# Patient Record
Sex: FEMALE | Race: WHITE | NOT HISPANIC OR LATINO | Employment: UNEMPLOYED | URBAN - NONMETROPOLITAN AREA
[De-identification: names, ages, dates, MRNs, and addresses within clinical notes are randomized per-mention and may not be internally consistent; named-entity substitution may affect disease eponyms.]

---

## 2023-05-01 ENCOUNTER — OFFICE VISIT (OUTPATIENT)
Dept: PRIMARY CARE | Facility: CLINIC | Age: 53
End: 2023-05-01
Payer: COMMERCIAL

## 2023-05-01 VITALS — HEART RATE: 70 BPM | OXYGEN SATURATION: 95 % | TEMPERATURE: 98.8 F

## 2023-05-01 DIAGNOSIS — J01.00 ACUTE NON-RECURRENT MAXILLARY SINUSITIS: Primary | ICD-10-CM

## 2023-05-01 PROCEDURE — 99213 OFFICE O/P EST LOW 20 MIN: CPT

## 2023-05-01 PROCEDURE — 1036F TOBACCO NON-USER: CPT

## 2023-05-01 RX ORDER — LEVONORGESTREL AND ETHINYL ESTRADIOL 6-5-10
1 KIT ORAL DAILY
COMMUNITY

## 2023-05-01 RX ORDER — DOXYCYCLINE 100 MG/1
100 TABLET ORAL 2 TIMES DAILY
Qty: 20 TABLET | Refills: 0 | Status: SHIPPED | OUTPATIENT
Start: 2023-05-01 | End: 2023-05-11

## 2023-05-01 ASSESSMENT — ENCOUNTER SYMPTOMS
FEVER: 0
RHINORRHEA: 1
ENDOCRINE NEGATIVE: 1
NEUROLOGICAL NEGATIVE: 1
PSYCHIATRIC NEGATIVE: 1
SORE THROAT: 1
EYE ITCHING: 1
SINUS PRESSURE: 1
GASTROINTESTINAL NEGATIVE: 1
MUSCULOSKELETAL NEGATIVE: 1
CARDIOVASCULAR NEGATIVE: 1
FATIGUE: 1
HEMATOLOGIC/LYMPHATIC NEGATIVE: 1
RESPIRATORY NEGATIVE: 1
ALLERGIC/IMMUNOLOGIC NEGATIVE: 1
EYE DISCHARGE: 1

## 2023-05-01 NOTE — PROGRESS NOTES
Subjective   Patient ID: Kaye Jarrett is a 53 y.o. female who presents for Cough (X1 week- productive cough-yellowish sputum), Sore Throat (X1 week), and Sinus Problem (X1 week has not tested for covid. Has not been around anyone sick.).  Patient presents car side with complaints of cough, congestion, facial pain, postnasal drainage, fatigue for the past week    No complaints of fever, has not been around anyone who is sick, no contact with anyone who has COVID  Patient has been taking Lindsay-Hunters plus for her symptoms as well as DayQuil to no relief  She started antihistamine approximately 3 days ago        Vitals:    05/01/23 1006   Pulse: 70   Temp: 37.1 °C (98.8 °F)   SpO2: 95%       Review of Systems   Constitutional:  Positive for fatigue. Negative for fever.   HENT:  Positive for congestion, dental problem, postnasal drip, rhinorrhea, sinus pressure and sore throat. Negative for hearing loss.    Eyes:  Positive for discharge and itching.   Respiratory: Negative.     Cardiovascular: Negative.    Gastrointestinal: Negative.    Endocrine: Negative.    Genitourinary: Negative.    Musculoskeletal: Negative.    Skin: Negative.    Allergic/Immunologic: Negative.    Neurological: Negative.    Hematological: Negative.    Psychiatric/Behavioral: Negative.         Objective   Physical Exam  Vitals (Deferred physical exam patient seen curbside) reviewed.         Assessment/Plan   Problem List Items Addressed This Visit    None  Visit Diagnoses       Acute non-recurrent maxillary sinusitis    -  Primary    Relevant Medications    doxycycline (Adoxa) 100 mg tablet    dextromethorphan-guaifenesin (Mucinex DM)  mg 12 hr tablet            Time Spent  Prep time on day of patient encounter: 5 minutes  Time spent directly with patient, family or caregiver: 15 minutes  Additional Time Spent on Patient Care Activities: 0 minutes  Documentation Time: 5 minutes  Other Time Spent: 0 minutes  Total: 25 minutes        Thank  you for coming in today, please call my office if you have any concerns or questions.     Clive HERRING, CNP

## 2023-05-01 NOTE — PATIENT INSTRUCTIONS
Treat with antibiotic and Mucinex, patient encouraged to return in 3 days if symptoms do not subside or start to improve.  Thank you for coming in today, if any questions or concerns arise, please call my office.   NEVAEH Bruner-CNP

## 2023-06-26 ENCOUNTER — OFFICE VISIT (OUTPATIENT)
Dept: PRIMARY CARE | Facility: CLINIC | Age: 53
End: 2023-06-26
Payer: COMMERCIAL

## 2023-06-26 VITALS — HEART RATE: 74 BPM | OXYGEN SATURATION: 93 % | TEMPERATURE: 98.9 F

## 2023-06-26 DIAGNOSIS — R05.1 ACUTE COUGH: Primary | ICD-10-CM

## 2023-06-26 DIAGNOSIS — R11.2 NAUSEA AND VOMITING, UNSPECIFIED VOMITING TYPE: ICD-10-CM

## 2023-06-26 PROCEDURE — 99214 OFFICE O/P EST MOD 30 MIN: CPT

## 2023-06-26 PROCEDURE — 87635 SARS-COV-2 COVID-19 AMP PRB: CPT

## 2023-06-26 PROCEDURE — 1036F TOBACCO NON-USER: CPT

## 2023-06-26 RX ORDER — METHYLPREDNISOLONE 4 MG/1
TABLET ORAL
Qty: 21 TABLET | Refills: 0 | Status: SHIPPED | OUTPATIENT
Start: 2023-06-26 | End: 2023-07-03

## 2023-06-26 RX ORDER — ONDANSETRON 4 MG/1
4 TABLET, ORALLY DISINTEGRATING ORAL EVERY 8 HOURS PRN
Qty: 12 TABLET | Refills: 0 | Status: SHIPPED | OUTPATIENT
Start: 2023-06-26 | End: 2023-06-30

## 2023-06-26 ASSESSMENT — ENCOUNTER SYMPTOMS
COUGH: 1
NEUROLOGICAL NEGATIVE: 1
SHORTNESS OF BREATH: 1
CARDIOVASCULAR NEGATIVE: 1
HEMATOLOGIC/LYMPHATIC NEGATIVE: 1
VOMITING: 1
ARTHRALGIAS: 1
PSYCHIATRIC NEGATIVE: 1
ALLERGIC/IMMUNOLOGIC NEGATIVE: 1
EYES NEGATIVE: 1
FATIGUE: 1
ENDOCRINE NEGATIVE: 1

## 2023-06-26 NOTE — PROGRESS NOTES
Subjective   Patient ID: Kaye Jarrett is a 53 y.o. female who presents for Cough (Kaye is being seen today for cough, vomiting and congestion. Has not been in contact with anyone who is sick. ).  HPI    Vitals:    06/26/23 1423   Pulse: 74   Temp: 37.2 °C (98.9 °F)   SpO2: 93%       Review of Systems   Constitutional:  Positive for fatigue.   HENT:  Positive for congestion.    Eyes: Negative.    Respiratory:  Positive for cough and shortness of breath.    Cardiovascular: Negative.    Gastrointestinal:  Positive for vomiting.   Endocrine: Negative.    Genitourinary: Negative.    Musculoskeletal:  Positive for arthralgias.   Skin: Negative.    Allergic/Immunologic: Negative.    Neurological: Negative.    Hematological: Negative.    Psychiatric/Behavioral: Negative.         Objective   Physical Exam  Vitals reviewed.   Constitutional:       General: She is not in acute distress.     Appearance: Normal appearance. She is normal weight. She is not toxic-appearing.   HENT:      Head: Normocephalic.      Nose: Rhinorrhea present.      Mouth/Throat:      Mouth: Mucous membranes are moist.   Cardiovascular:      Rate and Rhythm: Normal rate and regular rhythm.      Pulses: Normal pulses.   Pulmonary:      Effort: Pulmonary effort is normal.      Breath sounds: Normal breath sounds.   Neurological:      Mental Status: She is alert.         Assessment/Plan   Problem List Items Addressed This Visit    None  Visit Diagnoses       Acute cough    -  Primary    Relevant Medications    methylPREDNISolone (Medrol Dospak) 4 mg tablets    Other Relevant Orders    Sars-CoV-2 PCR, Symptomatic    Nausea and vomiting, unspecified vomiting type        Relevant Medications    ondansetron ODT (Zofran-ODT) 4 mg disintegrating tablet                 Thank you for coming in today, please call my office if you have any concerns or questions.     Clive HERRING, CNP

## 2023-06-27 LAB — SARS-COV-2 RESULT: NOT DETECTED

## 2023-06-28 ENCOUNTER — TELEPHONE (OUTPATIENT)
Dept: PRIMARY CARE | Facility: CLINIC | Age: 53
End: 2023-06-28
Payer: COMMERCIAL

## 2023-06-28 NOTE — TELEPHONE ENCOUNTER
Spoke with Kaye, she is going to call the office tomorrow with symptoms. If worse, I will send antibiotic.

## 2023-06-28 NOTE — TELEPHONE ENCOUNTER
Spoke with Kaye verbalized understanding. Is not feeling any better at the moment. Would like to know if you have any recommendations.

## 2023-06-28 NOTE — TELEPHONE ENCOUNTER
----- Message from NEVAEH Bruner-CNP sent at 6/27/2023  8:00 AM EDT -----  Negative for COVID please notify

## 2023-11-28 ENCOUNTER — APPOINTMENT (OUTPATIENT)
Dept: PRIMARY CARE | Facility: CLINIC | Age: 53
End: 2023-11-28
Payer: COMMERCIAL

## 2023-11-29 ENCOUNTER — OFFICE VISIT (OUTPATIENT)
Dept: PRIMARY CARE | Facility: CLINIC | Age: 53
End: 2023-11-29
Payer: COMMERCIAL

## 2023-11-29 VITALS
SYSTOLIC BLOOD PRESSURE: 122 MMHG | BODY MASS INDEX: 29.56 KG/M2 | OXYGEN SATURATION: 99 % | DIASTOLIC BLOOD PRESSURE: 66 MMHG | WEIGHT: 179 LBS | HEART RATE: 81 BPM | TEMPERATURE: 98.1 F

## 2023-11-29 DIAGNOSIS — J01.11 ACUTE RECURRENT FRONTAL SINUSITIS: Primary | ICD-10-CM

## 2023-11-29 PROCEDURE — 1036F TOBACCO NON-USER: CPT | Performed by: FAMILY MEDICINE

## 2023-11-29 PROCEDURE — 99214 OFFICE O/P EST MOD 30 MIN: CPT | Performed by: FAMILY MEDICINE

## 2023-11-29 RX ORDER — DOXYCYCLINE 100 MG/1
100 CAPSULE ORAL 2 TIMES DAILY
Qty: 28 CAPSULE | Refills: 0 | Status: SHIPPED | OUTPATIENT
Start: 2023-11-29 | End: 2023-12-13

## 2023-11-29 ASSESSMENT — ENCOUNTER SYMPTOMS
SORE THROAT: 1
UNEXPECTED WEIGHT CHANGE: 0
ARTHRALGIAS: 0
JOINT SWELLING: 0
SINUS PAIN: 1
DYSURIA: 0
EYE DISCHARGE: 0
LIGHT-HEADEDNESS: 0
FEVER: 0
ABDOMINAL PAIN: 0
SLEEP DISTURBANCE: 0
ACTIVITY CHANGE: 0
EYE ITCHING: 0
DIARRHEA: 0
FLANK PAIN: 0
DYSPHORIC MOOD: 0
CONSTIPATION: 0
RHINORRHEA: 0
NUMBNESS: 0
APPETITE CHANGE: 0
HEMATURIA: 0
MYALGIAS: 0
WHEEZING: 0
HEADACHES: 0
SHORTNESS OF BREATH: 0
WEAKNESS: 0
NAUSEA: 0
SINUS PRESSURE: 1
BLOOD IN STOOL: 0
VOMITING: 0
NERVOUS/ANXIOUS: 0
COUGH: 0
PALPITATIONS: 0
DIZZINESS: 0

## 2023-11-29 NOTE — PROGRESS NOTES
"Subjective   Patient ID: Kaye Jarrett is a 53 y.o. female who presents for Cough (All symptoms x 1.5 weeks.  DECLINES Covid swab.), Nasal Congestion, Sore Throat (Gone since last Wednesday. ), and Headache.    HPI RECURRENT ENT ISSUES   FEELS BEST WITH THE MEDROL DOSE KEVIN FOR AWHILE THEN GETS CONGESTED  TRIED FLONASE FOR TWO DAYS AND THOUGHT IT DID NOT \"WORK\" SO STOPPED THAT     Review of Systems   Constitutional:  Negative for activity change, appetite change, fever and unexpected weight change.   HENT:  Positive for congestion, ear pain, postnasal drip, sinus pressure, sinus pain and sore throat. Negative for rhinorrhea.    Eyes:  Negative for discharge, itching and visual disturbance.   Respiratory:  Negative for cough, shortness of breath and wheezing.    Cardiovascular:  Negative for chest pain, palpitations and leg swelling.   Gastrointestinal:  Negative for abdominal pain, blood in stool, constipation, diarrhea, nausea and vomiting.   Endocrine: Negative for cold intolerance, heat intolerance and polyuria.   Genitourinary:  Negative for dysuria, flank pain and hematuria.   Musculoskeletal:  Negative for arthralgias, gait problem, joint swelling and myalgias.   Skin:  Negative for rash.   Allergic/Immunologic: Negative for environmental allergies and food allergies.   Neurological:  Negative for dizziness, syncope, weakness, light-headedness, numbness and headaches.   Psychiatric/Behavioral:  Negative for dysphoric mood and sleep disturbance. The patient is not nervous/anxious.        Objective   /66   Pulse 81   Temp 36.7 °C (98.1 °F)   Wt 81.2 kg (179 lb)   SpO2 99%   BMI 29.56 kg/m²     Physical Exam  Vitals and nursing note reviewed.   Constitutional:       Appearance: Normal appearance.   HENT:      Head: Normocephalic.      Mouth/Throat:      Mouth: Mucous membranes are dry.      Pharynx: Posterior oropharyngeal erythema present.      Comments: THICK DRAINAGE   NASAL MUCOSA IS RED AND " SWOLLEN   RECURRENT DINUS ISSUES SEPTAL DEVIATION   OLD CT SHOED INFECTION IN MAXILLARY SINUS CAVITY   PERHAPS ENVIRONMENTAL ALLERGIES   Cardiovascular:      Rate and Rhythm: Normal rate and regular rhythm.      Pulses: Normal pulses.      Heart sounds: Normal heart sounds. No murmur heard.     No friction rub. No gallop.   Pulmonary:      Effort: Pulmonary effort is normal. No respiratory distress.      Breath sounds: Normal breath sounds. No wheezing.   Abdominal:      General: Bowel sounds are normal. There is no distension.      Palpations: Abdomen is soft.      Tenderness: There is no abdominal tenderness.   Musculoskeletal:         General: No deformity. Normal range of motion.   Skin:     General: Skin is warm and dry.      Capillary Refill: Capillary refill takes less than 2 seconds.   Neurological:      General: No focal deficit present.      Mental Status: She is alert and oriented to person, place, and time.   Psychiatric:         Mood and Affect: Mood normal.         Assessment/Plan   Diagnoses and all orders for this visit:  Acute recurrent frontal sinusitis  -     Referral to ENT; Future

## 2023-12-11 ENCOUNTER — OFFICE VISIT (OUTPATIENT)
Dept: OTOLARYNGOLOGY | Facility: CLINIC | Age: 53
End: 2023-12-11
Payer: COMMERCIAL

## 2023-12-11 DIAGNOSIS — J01.11 ACUTE RECURRENT FRONTAL SINUSITIS: ICD-10-CM

## 2023-12-11 DIAGNOSIS — J34.89 SINUS PRESSURE: Primary | ICD-10-CM

## 2023-12-11 PROCEDURE — 99204 OFFICE O/P NEW MOD 45 MIN: CPT | Performed by: OTOLARYNGOLOGY

## 2023-12-11 PROCEDURE — 1036F TOBACCO NON-USER: CPT | Performed by: OTOLARYNGOLOGY

## 2023-12-11 RX ORDER — PREDNISONE 10 MG/1
TABLET ORAL
Qty: 34 TABLET | Refills: 0 | Status: SHIPPED | OUTPATIENT
Start: 2023-12-11 | End: 2023-12-27 | Stop reason: ALTCHOICE

## 2023-12-11 RX ORDER — OXYMETAZOLINE HYDROCHLORIDE 0.05 G/100ML
2 SPRAY, METERED NASAL EVERY 12 HOURS PRN
Qty: 30 ML | Refills: 0 | Status: SHIPPED | OUTPATIENT
Start: 2023-12-11 | End: 2023-12-27

## 2023-12-11 NOTE — PROGRESS NOTES
Ongoing SW/CM Assessment/Plan of Care Note     See SW/CM flowsheets for goals and other objective data.    PT Recommendation:  Recommendation for Discharge: PT [place]: Less intensive rehab       OT Recommendation:  Recommendations for Discharge: OT [place]: Less intensive rehab       Disposition:  Planned Discharge Destination: Location not determined at this time    Progress note:   Vikas with [facility] (p:[phone] / f: [phone]) requests update on anticipated discharge plan for patient on Monday.       History Of Present Illness    Kaye Jarrett is a 53 y.o. female presenting with sinus issues and headache. She is kindly referred by Ana Cristina Zapata, DO    For about three weeks she has a cold like disease. She has headache and sinus pressure. Lately she has teeth pain, too. She is on doxycycline.    She has thick postnasal mucus.  Coughing sneezing blowing her nose every day.  Headache every day.    On examination, moderate congestion of left inferior turbinate.  I have not seen postnasal discharge.  Tonsillectomy (+)  Tympanic membrane's look intact.    My impression she has nasal - sinus congestion, possible trapped secretions causing sinus pressure-headache.    Recommendation  1-continue doxycycline  2-Afrin nasal spray for 7 days  3-prednisone tablet for 10 days  4-follow-up in 2 weeks if the problem persists     Past Medical History  She has a past medical history of Acute recurrent frontal sinusitis (09/10/2018), Headache, unspecified (12/17/2018), and Personal history of other diseases of the respiratory system (09/26/2018).    Surgical History  She has a past surgical history that includes No past surgeries.     Social History  She reports that she has never smoked. She has never used smokeless tobacco. She reports that she does not drink alcohol and does not use drugs.    Family History  No family history on file.     Allergies  Penicillins    Review of Systems   Sinus pressure  Sore throat  Cough  Headache     Physical Exam    General appearance: Healthy-appearing, well-nourished, well groomed, in no acute distress.     Head and Face: Atraumatic with no masses, lesions, or scarring.      Salivary glands: No tenderness of the parotid glands or parotid masses.     No tenderness of the submandibular glands or submandibular masses.      Facial strength: Normal strength and symmetry, no synkinesis or facial tic.     Eyes: Conjunctivas look non-hyperemic bilaterally    Ears: Bilaterally ear canals look  normal. Tympanic membranes look intact, no hyperemia, fluid or retraction. Hearing grossly normal.      Nose: Mucosa looks normal. No purulent discharge. Septum essentially straight.     Oral Cavity/Mouth: Lips and tongue look normal.     Throat: No postnasal discharge. No tonsil hypertrophy. No hyperemia.    Neck: Symmetrical, trachea midline.     Pulmonary: Normal respiratory effort.     Lymphatic: No palpable pathologic lymph nodes at neck.     Neurological/Psychiatric Orientation to person, place, and time: Normal.     Mood and affect: Normal.      Extremities: No clubbing.     Skin: No significant skin lesions were noted at face or neck        Procedure       Last Recorded Vitals  There were no vitals taken for this visit.    Relevant Results  Prior to Admission medications    Medication Sig Start Date End Date Taking? Authorizing Provider   doxycycline (Vibramycin) 100 mg capsule Take 1 capsule (100 mg) by mouth 2 times a day for 14 days. Take with at least 8 ounces (large glass) of water, do not lie down for 30 minutes after 11/29/23 12/13/23  Ana Cristina Zapata DO   [place], 28, 50-30 (6)/75-40 (5)/125-30(10) per tablet Take 1 tablet by mouth once daily. as directed    Historical Provider, MD     No results found.      Assessment/Plan   Kaye Jarrett is a 53 y.o. female presenting with sinus issues and headache. She is kindly referred by Ana Cristina Zapata DO    For about three weeks she has a cold like disease. She has headache and sinus pressure. Lately she has teeth pain, too. She is on doxycycline.    She has thick postnasal mucus.  Coughing sneezing blowing her nose every day.  Headache every day.    On examination, moderate congestion of left inferior turbinate.  I have not seen postnasal discharge.  Tonsillectomy (+)  Tympanic membrane's look intact.    My impression she has nasal - sinus congestion, possible trapped secretions causing sinus pressure-headache.    Recommendation  1-continue  doxycycline  2-Afrin nasal spray for 7 days  3-prednisone tablet for 10 days  4-follow-up in 2 weeks if the problem persists    Champ Redd  Otolaryngology - Head & Neck Surgery

## 2023-12-11 NOTE — LETTER
December 11, 2023     Ana Cristina Zapata DO  [address] [facility] [address]    Patient: Kaye Jarrett   YOB: 1970   Date of Visit: 12/11/2023       Dear Dr. Ana Cristina Zapata DO:    Thank you for referring Kaye Jarrett to me for evaluation. Below are my notes for this consultation.  If you have questions, please do not hesitate to call me. I look forward to following your patient along with you.       Sincerely,     Champ Velarde MD      CC: No Recipients  ______________________________________________________________________________________    History Of Present Illness    Kaye Jarrett is a 53 y.o. female presenting with sinus issues and headache. She is kindly referred by Ana Cristina Zapata DO    For about three weeks she has a cold like disease. She has headache and sinus pressure. Lately she has teeth pain, too. She is on doxycycline.    She has thick postnasal mucus.  Coughing sneezing blowing her nose every day.  Headache every day.    On examination, moderate congestion of left inferior turbinate.  I have not seen postnasal discharge.  Tonsillectomy (+)  Tympanic membrane's look intact.    My impression she has nasal - sinus congestion, possible trapped secretions causing sinus pressure-headache.    Recommendation  1-continue doxycycline  2-Afrin nasal spray for 7 days  3-prednisone tablet for 10 days  4-follow-up in 2 weeks if the problem persists     Past Medical History  She has a past medical history of Acute recurrent frontal sinusitis (09/10/2018), Headache, unspecified (12/17/2018), and Personal history of other diseases of the respiratory system (09/26/2018).    Surgical History  She has a past surgical history that includes No past surgeries.     Social History  She reports that she has never smoked. She has never used smokeless tobacco. She reports that she does not drink alcohol and does not use drugs.    Family History  No family history on file.      Allergies  Penicillins    Review of Systems   Sinus pressure  Sore throat  Cough  Headache     Physical Exam    General appearance: Healthy-appearing, well-nourished, well groomed, in no acute distress.     Head and Face: Atraumatic with no masses, lesions, or scarring.      Salivary glands: No tenderness of the parotid glands or parotid masses.     No tenderness of the submandibular glands or submandibular masses.      Facial strength: Normal strength and symmetry, no synkinesis or facial tic.     Eyes: Conjunctivas look non-hyperemic bilaterally    Ears: Bilaterally ear canals look normal. Tympanic membranes look intact, no hyperemia, fluid or retraction. Hearing grossly normal.      Nose: Mucosa looks normal. No purulent discharge. Septum essentially straight.     Oral Cavity/Mouth: Lips and tongue look normal.     Throat: No postnasal discharge. No tonsil hypertrophy. No hyperemia.    Neck: Symmetrical, trachea midline.     Pulmonary: Normal respiratory effort.     Lymphatic: No palpable pathologic lymph nodes at neck.     Neurological/Psychiatric Orientation to person, place, and time: Normal.     Mood and affect: Normal.      Extremities: No clubbing.     Skin: No significant skin lesions were noted at face or neck        Procedure       Last Recorded Vitals  There were no vitals taken for this visit.    Relevant Results  Prior to Admission medications    Medication Sig Start Date End Date Taking? Authorizing Provider   doxycycline (Vibramycin) 100 mg capsule Take 1 capsule (100 mg) by mouth 2 times a day for 14 days. Take with at least 8 ounces (large glass) of water, do not lie down for 30 minutes after 11/29/23 12/13/23  Ana Cristina Zapata, DO   [place], 28, 50-30 (6)/75-40 (5)/125-30(10) per tablet Take 1 tablet by mouth once daily. as directed    Historical Provider, MD     No results found.      Assessment/Plan  Kaye Jarrett is a 53 y.o. female presenting with sinus issues and headache. She is  kindly referred by Ana Cristina Zapata DO    For about three weeks she has a cold like disease. She has headache and sinus pressure. Lately she has teeth pain, too. She is on doxycycline.    She has thick postnasal mucus.  Coughing sneezing blowing her nose every day.  Headache every day.    On examination, moderate congestion of left inferior turbinate.  I have not seen postnasal discharge.  Tonsillectomy (+)  Tympanic membrane's look intact.    My impression she has nasal - sinus congestion, possible trapped secretions causing sinus pressure-headache.    Recommendation  1-continue doxycycline  2-Afrin nasal spray for 7 days  3-prednisone tablet for 10 days  4-follow-up in 2 weeks if the problem persists    Champ Redd  Otolaryngology - Head & Neck Surgery

## 2023-12-27 ENCOUNTER — OFFICE VISIT (OUTPATIENT)
Dept: PRIMARY CARE | Facility: CLINIC | Age: 53
End: 2023-12-27
Payer: COMMERCIAL

## 2023-12-27 ENCOUNTER — ANCILLARY PROCEDURE (OUTPATIENT)
Dept: RADIOLOGY | Facility: CLINIC | Age: 53
End: 2023-12-27
Payer: COMMERCIAL

## 2023-12-27 VITALS
DIASTOLIC BLOOD PRESSURE: 79 MMHG | BODY MASS INDEX: 29.23 KG/M2 | WEIGHT: 177 LBS | SYSTOLIC BLOOD PRESSURE: 120 MMHG | OXYGEN SATURATION: 98 % | HEART RATE: 105 BPM | TEMPERATURE: 99.7 F

## 2023-12-27 DIAGNOSIS — R05.1 ACUTE COUGH: Primary | ICD-10-CM

## 2023-12-27 DIAGNOSIS — R05.1 ACUTE COUGH: ICD-10-CM

## 2023-12-27 DIAGNOSIS — K21.9 GASTROESOPHAGEAL REFLUX DISEASE WITHOUT ESOPHAGITIS: ICD-10-CM

## 2023-12-27 LAB
FLUAV RNA RESP QL NAA+PROBE: DETECTED
FLUBV RNA RESP QL NAA+PROBE: NOT DETECTED
RSV RNA RESP QL NAA+PROBE: NOT DETECTED
SARS-COV-2 RNA RESP QL NAA+PROBE: NOT DETECTED

## 2023-12-27 PROCEDURE — 71046 X-RAY EXAM CHEST 2 VIEWS: CPT | Performed by: RADIOLOGY

## 2023-12-27 PROCEDURE — 99214 OFFICE O/P EST MOD 30 MIN: CPT

## 2023-12-27 PROCEDURE — 71046 X-RAY EXAM CHEST 2 VIEWS: CPT

## 2023-12-27 PROCEDURE — 87637 SARSCOV2&INF A&B&RSV AMP PRB: CPT

## 2023-12-27 PROCEDURE — 1036F TOBACCO NON-USER: CPT

## 2023-12-27 RX ORDER — AZITHROMYCIN 250 MG/1
TABLET, FILM COATED ORAL
Qty: 6 TABLET | Refills: 0 | Status: SHIPPED | OUTPATIENT
Start: 2023-12-27 | End: 2024-01-01

## 2023-12-27 RX ORDER — FAMOTIDINE 20 MG/1
20 TABLET, FILM COATED ORAL 2 TIMES DAILY
Qty: 30 TABLET | Refills: 0 | Status: SHIPPED | OUTPATIENT
Start: 2023-12-27 | End: 2024-01-11

## 2023-12-27 NOTE — PATIENT INSTRUCTIONS
Chest xr  Azithromycin  Viral swab    Thank you for coming in today, if any questions or concerns arise, please call my office.   NEVAEH Bruner-CNP

## 2023-12-27 NOTE — PROGRESS NOTES
Subjective   Patient ID: Kaye Jarrett is a 53 y.o. female who presents for Cough (Kaye is here for a sore throat, cough, headache and ear ache. Has had this since her last visit. Has seen ENT gave prednisone and didn't help much. It relieved symptoms for a few days then came right back. ).  Subjective  Kaye Jarrett is a 53 y.o. female who presents for evaluation of sinus pain. Symptoms include: congestion, cough, facial pain, purulent rhinorrhea, sinus pressure, and sore throat. Onset of symptoms was 4 weeks ago. Symptoms have been gradually worsening since that time. Past history is significant for occasional episodes of bronchitis. Patient is a non-smoker.    Symptoms started late November  Has been on Doxycycline, Prednisone, Afrin, some relief from pred.   Concern for viral etiology - viral swab    Objective  [unfilled]     Assessment/Plan  Acute non-bacterial sinusitis.    Nasal saline sprays.  Neti pot recommended. Instructions given.  Nasal steroids per medication orders.  Antihistamines per medication orders.  Azithromycin per medication orders.          Vitals:    12/27/23 1105   BP: 120/79   Pulse: 105   Temp: 37.6 °C (99.7 °F)   SpO2: 98%       Review of Systems    Objective   Physical Exam    Assessment/Plan   Problem List Items Addressed This Visit    None  Visit Diagnoses       Acute cough    -  Primary    Relevant Medications    azithromycin (Zithromax) 250 mg tablet    Other Relevant Orders    XR chest 2 views    Influenza A, and B PCR    RSV PCR    Sars-CoV-2 PCR, Symptomatic    Gastroesophageal reflux disease without esophagitis        Relevant Medications    famotidine (Pepcid) 20 mg tablet                 Thank you for coming in today, please call my office if you have any concerns or questions.     Clive HERRING, CNP

## 2023-12-28 ENCOUNTER — TELEPHONE (OUTPATIENT)
Dept: PRIMARY CARE | Facility: CLINIC | Age: 53
End: 2023-12-28
Payer: COMMERCIAL

## 2023-12-28 NOTE — TELEPHONE ENCOUNTER
----- Message from Clive Lujan, NEVAEH-CNP sent at 12/28/2023 11:19 AM EST -----  Flu A positive, continue z chanell

## 2024-01-02 ENCOUNTER — TELEPHONE (OUTPATIENT)
Dept: PRIMARY CARE | Facility: CLINIC | Age: 54
End: 2024-01-02
Payer: COMMERCIAL

## 2024-08-29 ENCOUNTER — HOSPITAL ENCOUNTER (OUTPATIENT)
Dept: RADIOLOGY | Facility: HOSPITAL | Age: 54
Discharge: HOME | End: 2024-08-29
Payer: COMMERCIAL

## 2024-08-29 VITALS — WEIGHT: 170 LBS | BODY MASS INDEX: 26.68 KG/M2 | HEIGHT: 67 IN

## 2024-08-29 DIAGNOSIS — Z12.39 SCREENING BREAST EXAMINATION: ICD-10-CM

## 2024-08-29 PROCEDURE — 77067 SCR MAMMO BI INCL CAD: CPT

## 2025-04-29 ENCOUNTER — OFFICE VISIT (OUTPATIENT)
Dept: PRIMARY CARE | Facility: CLINIC | Age: 55
End: 2025-04-29
Payer: COMMERCIAL

## 2025-04-29 VITALS
OXYGEN SATURATION: 98 % | DIASTOLIC BLOOD PRESSURE: 76 MMHG | BODY MASS INDEX: 27.58 KG/M2 | HEART RATE: 78 BPM | WEIGHT: 177.8 LBS | TEMPERATURE: 97.1 F | SYSTOLIC BLOOD PRESSURE: 140 MMHG

## 2025-04-29 DIAGNOSIS — J06.9 UPPER RESPIRATORY TRACT INFECTION, UNSPECIFIED TYPE: Primary | ICD-10-CM

## 2025-04-29 PROCEDURE — 99213 OFFICE O/P EST LOW 20 MIN: CPT

## 2025-04-29 PROCEDURE — 1036F TOBACCO NON-USER: CPT

## 2025-04-29 RX ORDER — DOXYCYCLINE 100 MG/1
100 TABLET ORAL 2 TIMES DAILY
Qty: 20 TABLET | Refills: 0 | Status: SHIPPED | OUTPATIENT
Start: 2025-04-29 | End: 2025-05-09

## 2025-04-29 NOTE — PROGRESS NOTES
Subjective   Patient ID: Kaye Jarrett is a 55 y.o. female who presents for Sinus Problem (Start last Wednesday/thursday), Cough (Productive cough- yellowish beige color mucus), Sore Throat, and sneezing.    Subjective  Kaye Jarrett is a 55 y.o. female who presents for evaluation of sinus pain. Symptoms include: congestion, cough, facial pain, foul rhinorrhea, frequent clearing of the throat, headaches, itchy eyes, and puffiness of the eyes. Onset of symptoms was 7 days ago. Symptoms have been gradually worsening since that time. Past history is significant for no history of pneumonia or bronchitis. Patient is a non-smoker.    Tried claritin, afrin.    Objective  [unfilled]     Assessment/Plan  Acute bacterial sinusitis.    Tetracycline therapy given URI symptoms, allergy to penicillin, per medication orders.          Vitals:    04/29/25 1136   BP: 140/76   Pulse: 78   Temp: 36.2 °C (97.1 °F)   SpO2: 98%       Review of Systems    Objective   Physical Exam    Assessment/Plan   Problem List Items Addressed This Visit    None  Visit Diagnoses         Upper respiratory tract infection, unspecified type    -  Primary    Relevant Medications    doxycycline (Adoxa) 100 mg tablet                 Thank you for coming in today, please call my office if you have any concerns or questions.     Clive HERRING, CNP